# Patient Record
Sex: FEMALE | Race: WHITE | NOT HISPANIC OR LATINO | Employment: FULL TIME | URBAN - METROPOLITAN AREA
[De-identification: names, ages, dates, MRNs, and addresses within clinical notes are randomized per-mention and may not be internally consistent; named-entity substitution may affect disease eponyms.]

---

## 2021-02-17 PROBLEM — I34.1 MITRAL VALVE PROLAPSE: Status: ACTIVE | Noted: 2021-02-17

## 2022-06-24 PROBLEM — H53.9 VISUAL DISTURBANCES: Status: ACTIVE | Noted: 2022-06-24

## 2022-09-02 PROBLEM — G43.109 MIGRAINE WITH AURA AND WITHOUT STATUS MIGRAINOSUS, NOT INTRACTABLE: Status: ACTIVE | Noted: 2022-09-02

## 2023-08-04 ENCOUNTER — OFFICE VISIT (OUTPATIENT)
Dept: CARDIOLOGY | Facility: CLINIC | Age: 23
End: 2023-08-04
Payer: COMMERCIAL

## 2023-08-04 VITALS
DIASTOLIC BLOOD PRESSURE: 72 MMHG | BODY MASS INDEX: 19.4 KG/M2 | SYSTOLIC BLOOD PRESSURE: 108 MMHG | WEIGHT: 123.6 LBS | HEIGHT: 67 IN | HEART RATE: 63 BPM

## 2023-08-04 DIAGNOSIS — R55 SYNCOPE AND COLLAPSE: ICD-10-CM

## 2023-08-04 DIAGNOSIS — I34.1 MITRAL VALVE PROLAPSE: Primary | ICD-10-CM

## 2023-08-04 DIAGNOSIS — R55 NEUROCARDIOGENIC SYNCOPE: ICD-10-CM

## 2023-08-04 RX ORDER — FLUDROCORTISONE ACETATE 0.1 MG/1
0.2 TABLET ORAL DAILY
Qty: 180 TABLET | Refills: 3 | Status: SHIPPED | OUTPATIENT
Start: 2023-08-04

## 2023-08-08 ENCOUNTER — TELEPHONE (OUTPATIENT)
Dept: CARDIOLOGY | Facility: CLINIC | Age: 23
End: 2023-08-08
Payer: COMMERCIAL

## 2023-08-08 NOTE — TELEPHONE ENCOUNTER
Mara Gutierrez return call.  Transferred call to [place].    Thank you,  Fifi CASE RN  Triage Nurse LCG   16:30 EDT

## 2023-08-08 NOTE — TELEPHONE ENCOUNTER
Patient returning your call Lunch 12-1:00 if after that   she said she is at work and cannot be reached until after 5:00

## 2023-08-08 NOTE — TELEPHONE ENCOUNTER
Called and left patient a voicemail to return call to office to reach out via [facility].  I would like to discuss morgan Conde with her.

## 2023-08-09 NOTE — TELEPHONE ENCOUNTER
Spoke to patient.  Plan decrease to 0.1mg florinef daily x2 weeks then stop  She will keep BP log and let me know how she is doing in about a week

## 2023-08-29 NOTE — TELEPHONE ENCOUNTER
Patient called with an update on how she feels with the change in her medication. She is only taking Florinef 0.1 mg 1 tab daily instead of two. More passing out in the shower. B/P is running low.     She can be reached at [phone]

## 2023-08-30 ENCOUNTER — TELEPHONE (OUTPATIENT)
Dept: CARDIOLOGY | Facility: CLINIC | Age: 23
End: 2023-08-30
Payer: COMMERCIAL

## 2023-08-30 NOTE — TELEPHONE ENCOUNTER
Spoke to patient.  She had 1 episode where she nearly passed out and had to lie down.  She has had some more fatigue and intermittent dizziness but no other significant events.  Her typical BP has been around 110/60.    I have discussed this patient with Dr. Paulson.  Continue Florinef 0.1 mg daily but will not plan on any further weaning at this time.  Patient was instructed to call back with any increase in symptoms.  I will have our office call and schedule her follow-up in 6 weeks as well as echo at this time.      Scheduling, can you reschedule her for 6-week follow-up with me and move her echo up to this time.

## 2023-08-30 NOTE — TELEPHONE ENCOUNTER
Mara Gutierrez returned call.  Transferred to [place].    Thank you,  Fifi CASE RN  Triage Nurse LCG   15:17 EDT

## 2023-09-05 ENCOUNTER — TELEPHONE (OUTPATIENT)
Dept: CARDIOLOGY | Facility: CLINIC | Age: 23
End: 2023-09-05

## 2023-09-05 NOTE — TELEPHONE ENCOUNTER
Caller: Mara Gutierrez    Relationship to patient: Self    Best call back number: [phone]    Type of visit: ECHO AND FOLLOW UP     Requested date: 10-26-23     If rescheduling, when is the original appointment: 02-24-24     Additional notes: PT IS WANTING TO GET HER ECHO AND FOLLOW UP R/S FOR 10-26-23. ATTEMPTED TO WARM TRANSFER. PLEASE REACH OUT TO PT TO FURTHER ADVISE.

## 2023-10-26 ENCOUNTER — HOSPITAL ENCOUNTER (OUTPATIENT)
Dept: CARDIOLOGY | Facility: HOSPITAL | Age: 23
Discharge: HOME | End: 2023-10-26
Admitting: NURSE PRACTITIONER
Payer: COMMERCIAL

## 2023-10-26 ENCOUNTER — OFFICE VISIT (OUTPATIENT)
Dept: CARDIOLOGY | Facility: CLINIC | Age: 23
End: 2023-10-26
Payer: COMMERCIAL

## 2023-10-26 VITALS
WEIGHT: 123 LBS | HEART RATE: 67 BPM | HEIGHT: 63 IN | BODY MASS INDEX: 21.79 KG/M2 | DIASTOLIC BLOOD PRESSURE: 70 MMHG | SYSTOLIC BLOOD PRESSURE: 100 MMHG

## 2023-10-26 VITALS — BODY MASS INDEX: 19.3 KG/M2 | HEIGHT: 67 IN | HEART RATE: 83 BPM | WEIGHT: 123 LBS

## 2023-10-26 DIAGNOSIS — R55 NEUROCARDIOGENIC SYNCOPE: ICD-10-CM

## 2023-10-26 DIAGNOSIS — I34.1 MITRAL VALVE PROLAPSE: Primary | ICD-10-CM

## 2023-10-26 DIAGNOSIS — I34.1 MITRAL VALVE PROLAPSE: ICD-10-CM

## 2023-10-26 LAB
AORTIC ARCH: 1.4 CM
ASCENDING AORTA: 2.2 CM
BH CV ECHO MEAS - ACS: 1.59 CM
BH CV ECHO MEAS - AO MAX PG: 14.1 MMHG
BH CV ECHO MEAS - AO MEAN PG: 8 MMHG
BH CV ECHO MEAS - AO ROOT DIAM: 2.48 CM
BH CV ECHO MEAS - AO V2 MAX: 188 CM/SEC
BH CV ECHO MEAS - AO V2 VTI: 35.1 CM
BH CV ECHO MEAS - AVA(I,D): 1.74 CM2
BH CV ECHO MEAS - EDV(CUBED): 125 ML
BH CV ECHO MEAS - EDV(MOD-SP2): 104 ML
BH CV ECHO MEAS - EDV(MOD-SP4): 91 ML
BH CV ECHO MEAS - EF(MOD-BP): 60.6 %
BH CV ECHO MEAS - EF(MOD-SP2): 66.3 %
BH CV ECHO MEAS - EF(MOD-SP4): 56 %
BH CV ECHO MEAS - ESV(CUBED): 39.7 ML
BH CV ECHO MEAS - ESV(MOD-SP2): 35 ML
BH CV ECHO MEAS - ESV(MOD-SP4): 40 ML
BH CV ECHO MEAS - FS: 31.8 %
BH CV ECHO MEAS - IVS/LVPW: 1.17 CM
BH CV ECHO MEAS - IVSD: 0.7 CM
BH CV ECHO MEAS - LAT PEAK E' VEL: 20.2 CM/SEC
BH CV ECHO MEAS - LV DIASTOLIC VOL/BSA (35-75): 55.3 CM2
BH CV ECHO MEAS - LV MASS(C)D: 104.6 GRAMS
BH CV ECHO MEAS - LV MAX PG: 6.4 MMHG
BH CV ECHO MEAS - LV MEAN PG: 4 MMHG
BH CV ECHO MEAS - LV SYSTOLIC VOL/BSA (12-30): 24.3 CM2
BH CV ECHO MEAS - LV V1 MAX: 126 CM/SEC
BH CV ECHO MEAS - LV V1 VTI: 22.1 CM
BH CV ECHO MEAS - LVIDD: 5 CM
BH CV ECHO MEAS - LVIDS: 3.4 CM
BH CV ECHO MEAS - LVOT AREA: 2.8 CM2
BH CV ECHO MEAS - LVOT DIAM: 1.88 CM
BH CV ECHO MEAS - LVPWD: 0.6 CM
BH CV ECHO MEAS - MED PEAK E' VEL: 14.7 CM/SEC
BH CV ECHO MEAS - MR MAX PG: 105.8 MMHG
BH CV ECHO MEAS - MR MAX VEL: 514.4 CM/SEC
BH CV ECHO MEAS - MV A DUR: 0.1 SEC
BH CV ECHO MEAS - MV A MAX VEL: 78 CM/SEC
BH CV ECHO MEAS - MV DEC SLOPE: 428.5 CM/SEC2
BH CV ECHO MEAS - MV DEC TIME: 0.2 SEC
BH CV ECHO MEAS - MV E MAX VEL: 104 CM/SEC
BH CV ECHO MEAS - MV E/A: 1.33
BH CV ECHO MEAS - MV MAX PG: 4.8 MMHG
BH CV ECHO MEAS - MV MEAN PG: 2.04 MMHG
BH CV ECHO MEAS - MV P1/2T: 75.7 MSEC
BH CV ECHO MEAS - MV V2 VTI: 26.4 CM
BH CV ECHO MEAS - MVA(P1/2T): 2.9 CM2
BH CV ECHO MEAS - MVA(VTI): 2.31 CM2
BH CV ECHO MEAS - PA ACC TIME: 0.17 SEC
BH CV ECHO MEAS - PA V2 MAX: 102 CM/SEC
BH CV ECHO MEAS - PULM A REVS DUR: 0.11 SEC
BH CV ECHO MEAS - PULM A REVS VEL: 24.9 CM/SEC
BH CV ECHO MEAS - PULM DIAS VEL: 61.7 CM/SEC
BH CV ECHO MEAS - PULM S/D: 0.89
BH CV ECHO MEAS - PULM SYS VEL: 54.8 CM/SEC
BH CV ECHO MEAS - QP/QS: 0.44
BH CV ECHO MEAS - RAP SYSTOLE: 3 MMHG
BH CV ECHO MEAS - RV MAX PG: 2.07 MMHG
BH CV ECHO MEAS - RV V1 MAX: 72 CM/SEC
BH CV ECHO MEAS - RV V1 VTI: 15.3 CM
BH CV ECHO MEAS - RVOT DIAM: 1.49 CM
BH CV ECHO MEAS - RVSP: 31 MMHG
BH CV ECHO MEAS - SI(MOD-SP2): 42 ML/M2
BH CV ECHO MEAS - SI(MOD-SP4): 31 ML/M2
BH CV ECHO MEAS - SUP REN AO DIAM: 1.8 CM
BH CV ECHO MEAS - SV(LVOT): 61.1 ML
BH CV ECHO MEAS - SV(MOD-SP2): 69 ML
BH CV ECHO MEAS - SV(MOD-SP4): 51 ML
BH CV ECHO MEAS - SV(RVOT): 26.7 ML
BH CV ECHO MEAS - TAPSE (>1.6): 2.34 CM
BH CV ECHO MEAS - TR MAX PG: 27.9 MMHG
BH CV ECHO MEAS - TR MAX VEL: 264.2 CM/SEC
BH CV ECHO MEASUREMENTS AVERAGE E/E' RATIO: 5.96
BH CV XLRA - RV BASE: 3.2 CM
BH CV XLRA - RV LENGTH: 6.4 CM
BH CV XLRA - RV MID: 2.6 CM
BH CV XLRA - TDI S': 16.3 CM/SEC
LEFT ATRIUM VOLUME INDEX: 24 ML/M2
SINUS: 2.5 CM
STJ: 1.78 CM

## 2023-10-26 PROCEDURE — 93306 TTE W/DOPPLER COMPLETE: CPT

## 2023-10-26 NOTE — PROGRESS NOTES
[facility] Follow Up Office Note     Encounter Date:10/26/23  Patient:Mara Gutierrez  :2000  MRN:[id number]      Chief Complaint:   Chief Complaint   Patient presents with    Mitral Valve Prolapse    Loss of Consciousness         History of Presenting Illness:        Mara Gutierrez is a 23 y.o. female who is here for follow-up.  She is a patient of Dr Paulson.    Patient has past medical history significant for mitral valve prolapse, palpitations, prior syncopal episodes.    In 2022 patient was seen in our office complaining of visual changes.  These were not felt to be related to her heart and she was referred to neurology.  Subsequently, she was seen by neurology in [place] and per patient diagnosed with migraine with aura and neurocardiogenic syncope.    I saw patient a couple of months ago.  Since she has been on Florinef for a couple of years now and had not had recurrent syncope we discharged to try to wean this.  Her dose was decreased from 0.2 mg daily to 0.1 mg daily.  Patient is back today for follow-up.  She had an echo prior to her appointment that shows normal LVEF with mild mitral valve prolapse and no significant regurgitation.    Patient reports she has had several dizzy episodes but no syncope since weaning the dose.  She also feels more tired at the end of the day.  She is concerned because her cousin was just diagnosed with Malcolm-Parkinson-White and had genetic testing that flagged a potential issue.  He is getting an ICD.  She does not know more specifics about his condition but is wondering about having genetic testing.  She still intermittently has episodes where her vision goes black or is blurry for about 10 minutes.  This was previously worked up with a normal MR brain and she was diagnosed with migraines with aura.    Review of Systems:  Review of Systems   Constitutional: Positive for malaise/fatigue.   Eyes:  Positive for visual disturbance.   Cardiovascular:   "Negative for chest pain, dyspnea on exertion, leg swelling, orthopnea and palpitations.   Respiratory:  Negative for shortness of breath.    Neurological:  Positive for dizziness.       Current Outpatient Medications on File Prior to Visit   Medication Sig Dispense Refill    Falmina 0.1-20 MG-MCG per tablet Take 1 tablet by mouth Daily.      fludrocortisone 0.1 MG tablet Take 2 tablets by mouth Daily. 180 tablet 3     No current facility-administered medications on file prior to visit.       Allergies   Allergen Reactions    Clindamycin/Lincomycin Anaphylaxis    Bactrim [Sulfamethoxazole-Trimethoprim] Other (See Comments)     Metal taste     Shellfish-Derived Products Other (See Comments)     Blister inside mouth    Acyclovir Rash     blisters    Adhesive Tape Rash     Band-aids only       History reviewed. No pertinent past medical history.    History reviewed. No pertinent surgical history.    Social History     Socioeconomic History    Marital status: Single   Tobacco Use    Smoking status: Never    Smokeless tobacco: Never   Substance and Sexual Activity    Alcohol use: Never     Comment: occas caffeine use     Drug use: Never       Family History   Problem Relation Age of Onset    Diabetes Maternal Aunt     Diabetes Maternal Grandmother        The following portions of the patient's history were reviewed and updated as appropriate: allergies, current medications, past family history, past medical history, past social history, past surgical history and problem list.       Objective:       Vitals:    10/26/23 1354   BP: 100/70   BP Location: Right arm   Patient Position: Sitting   Pulse: 67   Weight: 55.8 kg (123 lb)   Height: 160 cm (63\")         Physical Exam:  Constitutional: Well appearing, well developed, no acute distress   HENT: Oropharynx clear and membrane moist  Eyes: Normal conjunctiva, no sclera icterus  Neck: Supple, no carotid bruit bilaterally  Cardiovascular: Regular rate and rhythm, No Murmur, No " "bilateral lower extremity edema  Pulmonary: Normal respiratory effort, normal lung sounds, no wheezing  Neurological: Alert and orient x 3  Skin: Warm, dry, no ecchymosis, no rash  Psych: Appropriate mood and affect. Normal judgment and insight         Lab Results   Component Value Date     10/01/2021    K 3.8 10/01/2021     10/01/2021    CO2 24.4 10/01/2021    BUN 8 10/01/2021    CREATININE 0.63 10/01/2021    EGFRIFNONA 117 10/01/2021    GLUCOSE 92 10/01/2021    CALCIUM 9.2 10/01/2021     No results found for: \"WBC\", \"HGB\", \"HCT\", \"MCV\", \"PLT\"  No results found for: \"CHOL\", \"TRIG\", \"HDL\", \"LDL\"  No results found for: \"PROBNP\", \"BNP\"  No results found for: \"CKTOTAL\", \"CKMB\", \"CKMBINDEX\", \"TROPONINI\", \"TROPONINT\"  No results found for: \"TSH\"        ECG 12 Lead    Date/Time: 10/26/2023 3:37 PM  Performed by: Ivone Matamoros APRN    Authorized by: Ivone Matamoros APRN  Comparison: compared with previous ECG from 8/4/2023  Similar to previous ECG  Rhythm: sinus rhythm  Rate: normal  BPM: 67  Conduction: conduction normal  ST Segments: ST segments normal             Assessment:          Diagnosis Plan   1. Mitral valve prolapse  ECG 12 Lead      2. Neurocardiogenic syncope               Plan:       Mitral valve prolapse - mild with no significant regurgitation on echocardiogram today    Syncope /orthostatic hypotension- likely vasodilatory / neurovascular with post viral etiology.  Her resting heart rate is controlled in the 60s.  We recently weaned Florinef from 0.2 mg daily to 0.1 mg daily.  She notes increased fatigue and has had a couple of dizzy episodes with no syncope.  We discussed this today and she would like to continue on her current dose in hopes of eventually not requiring medication.  She tries to hydrate and liberate salt intake.  She also is cautious with position changes and hot showers.  She will try compression.    Patient is seen today for follow-up.  She is tolerating reduced " Florinef dose and will continue the same as well as lifestyle modifications.  Her echo today is stable.      In regards to cousin that was just diagnosed with Malcolm-Parkinson-White and ? cardiomyopathy, her EKG does not have delta wave and WPW is not a current concern for her.  I discussed this with Dr. Paulson.  In regards to genetic testing for cardiomyopathy I will call patient. I am happy to try to order as long as she understands this is not typically indicated for her until her parent has abnormal testing and is unlikely to be covered by insurance.    Follow-up recommended in 6 months with Dr Paulson        Orders Placed This Encounter   Procedures    ECG 12 Lead     This order was created via procedure documentation     Order Specific Question:   Release to patient     Answer:   Routine Release [[id number]]            GERSON Page  [facility]  10/26/23  15:41 EDT

## 2023-10-30 ENCOUNTER — TELEPHONE (OUTPATIENT)
Dept: CARDIOLOGY | Facility: CLINIC | Age: 23
End: 2023-10-30
Payer: COMMERCIAL

## 2023-11-02 ENCOUNTER — TELEPHONE (OUTPATIENT)
Dept: CARDIOLOGY | Facility: CLINIC | Age: 23
End: 2023-11-02
Payer: COMMERCIAL

## 2023-11-02 NOTE — TELEPHONE ENCOUNTER
Patient left a voicemail this morning stating that she is returning your call from Monday. I dont see anything in her chart where you had called her.     Call back number is [phone].      Please advise.

## 2023-11-10 NOTE — TELEPHONE ENCOUNTER
I have tried to call her.   She has a voicemail that has not been set up yet so I have not been able to connect or leave a message.    If she calls back, I want to let her know that in regards to genetic testing this is not the typical order to get testing however I can refer her to genetic counseling at [facility] and request cardiac panel.  It would be helpful if she can give us the specific gene that is abnormal in her family so I can include that information in the referral as well.  If she is agreeable I will place the order.    Thanks

## 2023-11-13 NOTE — TELEPHONE ENCOUNTER
Can you try to reach her today regarding information as below.  If she is agreeable I will put the genetics consult in.  Thanks

## 2025-02-04 RX ORDER — FLUDROCORTISONE ACETATE 0.1 MG/1
0.2 TABLET ORAL DAILY
Qty: 60 TABLET | Refills: 1 | Status: SHIPPED | OUTPATIENT
Start: 2025-02-04

## 2025-02-04 NOTE — TELEPHONE ENCOUNTER
Patient is currently out of medication. She was last seen 2023. I am working on getting her an apt. Ok to refill for 30 day and 1 refill.

## 2025-03-17 RX ORDER — FLUDROCORTISONE ACETATE 0.1 MG/1
0.2 TABLET ORAL DAILY
Qty: 180 TABLET | Refills: 3 | Status: SHIPPED | OUTPATIENT
Start: 2025-03-17

## 2025-04-03 ENCOUNTER — OFFICE VISIT (OUTPATIENT)
Age: 25
End: 2025-04-03
Payer: COMMERCIAL

## 2025-04-03 VITALS
BODY MASS INDEX: 22.01 KG/M2 | SYSTOLIC BLOOD PRESSURE: 108 MMHG | HEART RATE: 74 BPM | DIASTOLIC BLOOD PRESSURE: 62 MMHG | HEIGHT: 63 IN | WEIGHT: 124.2 LBS

## 2025-04-03 DIAGNOSIS — R55 NEUROCARDIOGENIC SYNCOPE: ICD-10-CM

## 2025-04-03 DIAGNOSIS — I34.1 MITRAL VALVE PROLAPSE: Primary | ICD-10-CM

## 2025-04-03 PROCEDURE — 99214 OFFICE O/P EST MOD 30 MIN: CPT | Performed by: INTERNAL MEDICINE

## 2025-04-03 PROCEDURE — 93000 ELECTROCARDIOGRAM COMPLETE: CPT | Performed by: INTERNAL MEDICINE

## 2025-04-03 RX ORDER — FLUDROCORTISONE ACETATE 0.1 MG/1
0.1 TABLET ORAL DAILY
Qty: 90 TABLET | Refills: 3 | Status: SHIPPED | OUTPATIENT
Start: 2025-04-03

## 2025-04-03 NOTE — PROGRESS NOTES
[facility] Follow Up Office Note     Encounter Date:25  Patient:Mara Gutierrez  :2000  MRN:[id number]      Chief Complaint:   Chief Complaint   Patient presents with    Mitral Valve Prolapse     History of Presenting Illness:      Ms. Smith is a 24 y.o. woman with past medical history notable for mitral valve prolapse who presents to our office for follow up regarding syncope.  Overall doing great on her current medical regimen she has not had any syncopal episode in the shower anymore.  She is doing well with the low-dose Florinef.  Also at work she is not having troubles with near syncope.  We were able to decrease from 0.2 mg down to 0.1 mg on the Florinef without any issues.  She did get somewhat lost to follow-up and ran out of her medications when this happened she did have issues with feeling dizzy and lightheaded and feeling worse.        Review of Systems:  Review of Systems   Constitutional: Negative.   HENT: Negative.     Eyes: Negative.    Cardiovascular:  Positive for palpitations.   Respiratory: Negative.     Endocrine: Negative.    Hematologic/Lymphatic: Negative.    Skin: Negative.    Musculoskeletal: Negative.    Gastrointestinal: Negative.    Genitourinary: Negative.    Neurological:  Positive for light-headedness.   Psychiatric/Behavioral: Negative.     Allergic/Immunologic: Negative.        Current Outpatient Medications on File Prior to Visit   Medication Sig Dispense Refill    Falmina 0.1-20 MG-MCG per tablet Take 1 tablet by mouth Daily.      [DISCONTINUED] fludrocortisone 0.1 MG tablet Take 2 tablets by mouth Daily. 180 tablet 3     No current facility-administered medications on file prior to visit.       Allergies   Allergen Reactions    Clindamycin/Lincomycin Anaphylaxis    Bactrim [Sulfamethoxazole-Trimethoprim] Other (See Comments)     Metal taste     Shellfish-Derived Products Other (See Comments)     Blister inside mouth    Acyclovir Rash     blisters     "Adhesive Tape Rash     Band-aids only       History reviewed. No pertinent past medical history.    History reviewed. No pertinent surgical history.    Social History     Socioeconomic History    Marital status: Single   Tobacco Use    Smoking status: Never    Smokeless tobacco: Never   Vaping Use    Vaping status: Never Used   Substance and Sexual Activity    Alcohol use: Never     Comment: occas caffeine use     Drug use: Never       Family History   Problem Relation Age of Onset    Diabetes Maternal Aunt     Diabetes Maternal Grandmother        The following portions of the patient's history were reviewed and updated as appropriate: allergies, current medications, past family history, past medical history, past social history, past surgical history and problem list.       Objective:       Vitals:    04/03/25 0941   BP: 108/62   BP Location: Right arm   Patient Position: Sitting   Pulse: 74   Weight: 56.3 kg (124 lb 3.2 oz)   Height: 160 cm (63\")       Body mass index is 22 kg/m².     Physical Exam:  Constitutional: Well appearing, Well-developed, No acute distress   HENT: Oropharynx clear and membrane moist  Eyes: Normal conjunctiva, no sclera icterus.  Neck: Supple, no carotid bruit bilaterally.  Cardiovascular: Regular rate and rhythm, No Murmur, No bilateral lower extremity edema.  Pulmonary: Normal respiratory effort, normal lung sounds, no wheezing.  Neurological: Alert and orient x 3.   Skin: Warm, dry, no ecchymosis, no rash.  Psych: Appropriate mood and affect. Normal judgment and insight.       Lab Results   Component Value Date     10/01/2021    K 3.8 10/01/2021     10/01/2021    CO2 24.4 10/01/2021    BUN 8 10/01/2021    CREATININE 0.63 10/01/2021    EGFRIFNONA 117 10/01/2021    GLUCOSE 92 10/01/2021    CALCIUM 9.2 10/01/2021     Lab Results   Component Value Date    WBC 6.89 04/12/2024    HGB 12.9 04/12/2024    HCT 38.6 04/12/2024    MCV 95.8 04/12/2024     04/12/2024     No results " "found for: \"CHOL\", \"TRIG\", \"HDL\", \"LDL\"  No results found for: \"PROBNP\", \"BNP\"  No results found for: \"CKTOTAL\", \"CKMB\", \"CKMBINDEX\", \"TROPONINI\", \"TROPONINT\"  No results found for: \"TSH\"        ECG 12 Lead    Date/Time: 4/3/2025 10:14 AM  Performed by: Jeronimo Paulson MD    Authorized by: Jeronimo Paulson MD  Comparison: compared with previous ECG from 10/26/2023  Similar to previous ECG  Rhythm: sinus rhythm          Echocardiogram 10/26/2023 images reviewed by myself:  Left ventricular systolic function is normal. Calculated left ventricular EF = 60.6% Normal left ventricular cavity size and wall thickness noted. All left ventricular wall segments contract normally. Left ventricular diastolic function was normal.  There is mild bileaflet mitral valve prolapse present. Mild mitral valve regurgitation is present with a posteriorly-directed jet noted.  Mild tricuspid valve regurgitation is present. Estimated right ventricular systolic pressure from tricuspid regurgitation is normal (<35 mmHg). Calculated right ventricular systolic pressure from tricuspid regurgitation is 31 mmHg.    2 Week Monitor 10/25/2021:  A normal monitor study.  Underlying heart rhythm was sinus rhythm with an average heart rate of 82 bpm and a range of 49 bpm up to 173 bpm.  Patient triggered events correlated with sinus rhythm    Holter monitor 3/3/2021:  A normal monitor study.  Underlying heart rhythm was sinus rhythm with sinus arrhythmia and average heart rate of 70 bpm and a range of 45 bpm up to 128 bpm  No symptoms recorded during study    Echocardiogram 2/17/2021:  Normal ejection fraction  Structurally normal heart with normal size chambers  Mild mitral regurgitation with an eccentric jet    Echocardiogram report 7/14/2015:  Normal left ventricular size and function  Mitral valve demonstrates mild eccentric regurgitation with mild mitral valve prolapse with normal tissue architecture with no evidence of myxomatous " disease.  Otherwise no significant valvular abnormalities.          Assessment:          Diagnosis Plan   1. Mitral valve prolapse  Basic Metabolic Panel    TSH Rfx On Abnormal To Free T4    CBC (No Diff)    ECG 12 Lead      2. Neurocardiogenic syncope  Basic Metabolic Panel    TSH Rfx On Abnormal To Free T4    CBC (No Diff)    ECG 12 Lead             Plan:       Ms. Smith is a 24 y.o. woman with past medical history notable for mitral valve prolapse who presents to our office for scheduled follow up.  Overall patient doing reasonably well.  She is doing great on the low-dose Florinef which is a good sign hopefully we can even wean her off of this completely.  Likely get a repeat echocardiogram in about 2 years but obviously if any worsening issues can get one sooner.  I have ordered repeat labs including CBC, BMP, TSH just to make sure there is no abnormalities contributing to her underlying issue last set of labs in April 2024 were within normal limits.    History of Syncope:  Likely vasovagal versus vasodilatory and have improved and essentially resolved on low-dose Florinef  Currently on Florinef 0.1 daily and would continue with current dosing  Echocardiogram 2/2021 and 10/2023 demonstrates normal heart structure with mild mitral valve regurgitation due to prolapse  Holter monitor 3/2021 and 10/2021 normal  Encourage adequate hydration and increasing salt intake  TSH within normal limits 4/2024 repeat TSH and BMP ordered    Palpitations:  Likely related to vasovagal syncope  Holter 3/2021 and 10/2021 normal    Mitral valve prolapse:  Echocardiogram 10/2023 demonstrates only mild eccentric regurgitation will follow every couple of years with subsequent echocardiograms.  Likely repeat echocardiogram in 1-2 years      Follow-up:  12 months      Thank you for allowing me to participate in the care of Mara Gutierrez. Feel free to contact me directly with any further questions or concerns.    Jeronimo Paulson,  MD [facility]  04/03/25  10:15 EDT

## 2025-05-01 ENCOUNTER — TELEPHONE (OUTPATIENT)
Dept: OBSTETRICS AND GYNECOLOGY | Age: 25
End: 2025-05-01

## 2025-05-01 NOTE — TELEPHONE ENCOUNTER
Hub staff attempted to follow warm transfer process and was unsuccessful     Caller: Mara Gutierrez    Relationship to patient: Self    Best call back number: [phone] V/M   DETAILED MSG IN REGARDS TO APPT     Patient is needing: PT HAS APPT R/S FOR 5/2/25 WANTS TO CHANGE TO 2:30 OR LATER IF POSSIBLE.    IF REQUEST CAN BE MADE PT OKAY TO SCHEDULE AND CALL V/M WITH APPT DETAILS AS LONG AS ITS AFTER 2:30PM     THANK YOU

## 2025-05-16 ENCOUNTER — OFFICE VISIT (OUTPATIENT)
Dept: OBSTETRICS AND GYNECOLOGY | Age: 25
End: 2025-05-16
Payer: COMMERCIAL

## 2025-05-16 VITALS
BODY MASS INDEX: 22.39 KG/M2 | SYSTOLIC BLOOD PRESSURE: 116 MMHG | WEIGHT: 126.4 LBS | HEIGHT: 63 IN | DIASTOLIC BLOOD PRESSURE: 70 MMHG

## 2025-05-16 DIAGNOSIS — Z01.419 ENCOUNTER FOR GYNECOLOGICAL EXAMINATION WITHOUT ABNORMAL FINDING: Primary | ICD-10-CM

## 2025-05-16 DIAGNOSIS — Z31.9 INFERTILITY MANAGEMENT: ICD-10-CM

## 2025-05-16 RX ORDER — MONTELUKAST SODIUM 10 MG/1
10 TABLET ORAL
COMMUNITY
Start: 2025-04-28

## 2025-05-16 RX ORDER — TRETINOIN 0.5 MG/G
1 CREAM TOPICAL NIGHTLY
COMMUNITY
Start: 2025-04-04

## 2025-05-16 RX ORDER — PIMECROLIMUS 10 MG/G
1 CREAM TOPICAL 2 TIMES DAILY
COMMUNITY
Start: 2025-04-04

## 2025-05-16 RX ORDER — TRIAMCINOLONE ACETONIDE 1 MG/G
1 CREAM TOPICAL 2 TIMES DAILY
COMMUNITY
Start: 2025-04-04

## 2025-05-16 NOTE — ASSESSMENT & PLAN NOTE
Reviewed attempted pregnancy course with patient.  She and her partner appear to be young and fairly healthy.  Patient is having regular monthly periods and has been using ovulation predictor kits for the past 4 months with LH surges noted.  We discussed the possible etiologies for infertility including lifestyle, uterine, fallopian tube, ovarian, male factor.  Patient appears to be completing all appropriate steps regarding lifestyle modification.  Notably she was using tretinoin and I advised her to stop this immediately.  Other medications were reviewed and discussed.  We will evaluate her uterus with a pelvic ultrasound at her next visit.  We discussed deferring hysterosalpingogram at this time given she is fairly low risk for a fallopian tube factor based on her history, but if her evaluation is otherwise normal we will pursue this.  We will evaluate ovarian reserve and ovulatory function with AMH and a 21 progesterone respectively.  A semen analysis for her partner has also been ordered.  Once her day 21 labs (planning to draw 5/26) have been completed, we will also evaluate a day 3 FSH and estradiol to further evaluate her ovarian reserve.  She has had normal thyroid testing 4/2024.  Patient understanding of plan and all questions addressed at this time.  Orders:    Antimullerian Hormone (AMH); Future    Progesterone; Future

## 2025-05-16 NOTE — PROGRESS NOTES
[facility]  Obstetrics and Gynecology   Routine Annual Visit    2025    Patient: Mara Gutierrez          MR#:[id number]    History of Present Illness    Chief Complaint   Patient presents with    Gynecologic Exam     NGYN- Establish care, pt being seen for family planning counseling, last annual 24, last pap smear 23     24 y.o. female  who presents for annual exam.    Patient also presents with difficulty getting pregnant.  She stopped taking her combined OCPs 2024 and she and her partner have been trying monthly since then.  She reports she started using clear blue OPK's 2025 and has had LH surges noted every month.  She has been having regular intercourse prior to the surges and during peak predicted fertility windows.  She reports a few months ago she had 1 positive pregnancy test but then started her period right after.  Also had a negative pregnancy test same day so thinks it was false positive.    Patient reports her periods are monthly with 5 to 6 days of bleeding.  She does not have any concerns about the amount of bleeding or any pain.  She denies any history of STI.    She reports she eats a healthy diet and tries to avoid any fried or sugary food    Studies reviewed:  TSH (2024 15:35)     Obstetric History:  OB History          0    Para   0    Term   0       0    AB   0    Living   0         SAB   0    IAB   0    Ectopic   0    Molar   0    Multiple   0    Live Births   0               Menstrual History:     Patient's last menstrual period was 2025 (exact date).       Sexual History:   Monogamous sexual relationship with male    Concern for IPV: Denies  Dyspareunia: Denies  Breast concerns: Denies  Fecal/urine incontinence: Denies  Concern for STI?:  Denies    Current contraception: none  History of abnormal Pap smear: no  Received Gardasil immunization:  yes - in adolescence  History of abnormal mammogram: n/a  Colon cancer screening:  n/a  ________________________________________  Patient Active Problem List   Diagnosis    Mitral valve prolapse    Syncope and collapse    Visual disturbances    Migraine with aura and without status migrainosus, not intractable    Neurocardiogenic syncope     History reviewed. No pertinent past medical history.  History reviewed. No pertinent surgical history.  Social History     Tobacco Use    Smoking status: Never    Smokeless tobacco: Never   Vaping Use    Vaping status: Never Used   Substance Use Topics    Alcohol use: Never     Comment: occas caffeine use     Drug use: Never     Family History   Problem Relation Age of Onset    Diabetes Maternal Grandmother     Lung cancer Maternal Grandmother     Lung cancer Maternal Grandfather     Diabetes Maternal Aunt     Liver cancer Nephew     Breast cancer Neg Hx     Uterine cancer Neg Hx     Ovarian cancer Neg Hx     Colon cancer Neg Hx     Pancreatic cancer Neg Hx     Prostate cancer Neg Hx      Prior to Admission medications    Medication Sig Start Date End Date Taking? Authorizing Provider   fludrocortisone 0.1 MG tablet Take 1 tablet by mouth Daily. 4/3/25  Yes Jeronimo Paulson MD   montelukast (SINGULAIR) 10 MG tablet Take 1 tablet by mouth every night at bedtime. 4/28/25  Yes Matt Murray MD   pimecrolimus (ELIDEL) 1 % cream Apply 1 Application topically to the appropriate area as directed 2 (Two) Times a Day. 4/4/25  Yes Matt Murray MD   tretinoin (RETIN-A) 0.05 % cream Apply 1 Application topically to the appropriate area as directed Every Night. 4/4/25  Yes Matt Murray MD   triamcinolone (KENALOG) 0.1 % cream Apply 1 Application topically to the appropriate area as directed 2 (Two) Times a Day. 4/4/25  Yes Matt Murray MD   Falmina 0.1-20 MG-MCG per tablet Take 1 tablet by mouth Daily.  Patient not taking: Reported on 5/16/2025 1/25/21   Matt Murray MD     ________________________________________    Review of  "Systems   Genitourinary:  Negative for menstrual problem and vaginal bleeding.          Objective     /70   Ht 160 cm (62.99\")   Wt 57.3 kg (126 lb 6.4 oz)   LMP 05/06/2025 (Exact Date)   BMI 22.40 kg/m²    BP Readings from Last 3 Encounters:   05/16/25 116/70   04/03/25 108/62   10/26/23 100/70      Wt Readings from Last 3 Encounters:   05/16/25 57.3 kg (126 lb 6.4 oz)   04/03/25 56.3 kg (124 lb 3.2 oz)   10/26/23 55.8 kg (123 lb)      BMI: Estimated body mass index is 22.4 kg/m² as calculated from the following:    Height as of this encounter: 160 cm (62.99\").    Weight as of this encounter: 57.3 kg (126 lb 6.4 oz).    Physical Exam  Vitals and nursing note reviewed.   Constitutional:       General: She is not in acute distress.     Appearance: Normal appearance.   HENT:      Head: Normocephalic and atraumatic.   Eyes:      Extraocular Movements: Extraocular movements intact.   Pulmonary:      Effort: Pulmonary effort is normal. No respiratory distress.   Chest:   Breasts:     Right: Normal. No mass, nipple discharge, skin change or tenderness.      Left: Normal. No mass, nipple discharge, skin change or tenderness.   Abdominal:      General: There is no distension.      Palpations: Abdomen is soft. There is no mass.      Tenderness: There is no abdominal tenderness.   Genitourinary:     General: Normal vulva.      Pubic Area: No rash.       Labia:         Right: No rash, lesion or injury.         Left: No rash, lesion or injury.       Urethra: No urethral lesion.      Vagina: Normal.      Cervix: Normal.      Uterus: Normal.       Adnexa: Right adnexa normal and left adnexa normal.      Rectum: Normal.   Lymphadenopathy:      Upper Body:      Right upper body: No supraclavicular or axillary adenopathy.      Left upper body: No supraclavicular or axillary adenopathy.   Skin:     General: Skin is warm and dry.   Neurological:      General: No focal deficit present.      Mental Status: She is alert. "   Psychiatric:         Mood and Affect: Mood normal.         Behavior: Behavior normal.       Assessment:  Mara Gutierrez is a 24 y.o.  presenting for well woman exam.       Encounter for gynecological examination without abnormal finding  Normal breast and pelvic exam today.       Infertility management  Reviewed attempted pregnancy course with patient.  She and her partner appear to be young and fairly healthy.  Patient is having regular monthly periods and has been using ovulation predictor kits for the past 4 months with LH surges noted.  We discussed the possible etiologies for infertility including lifestyle, uterine, fallopian tube, ovarian, male factor.  Patient appears to be completing all appropriate steps regarding lifestyle modification.  Notably she was using tretinoin and I advised her to stop this immediately.  Other medications were reviewed and discussed.  We will evaluate her uterus with a pelvic ultrasound at her next visit.  We discussed deferring hysterosalpingogram at this time given she is fairly low risk for a fallopian tube factor based on her history, but if her evaluation is otherwise normal we will pursue this.  We will evaluate ovarian reserve and ovulatory function with AMH and a 21 progesterone respectively.  A semen analysis for her partner has also been ordered.  Once her day 21 labs (planning to draw ) have been completed, we will also evaluate a day 3 FSH and estradiol to further evaluate her ovarian reserve.  She has had normal thyroid testing 2024.  Patient understanding of plan and all questions addressed at this time.  Orders:    Antimullerian Hormone (AMH); Future    Progesterone; Future      As part of wellness and prevention, the following topics were discussed with the patient:  Encouraged self breast awareness  Physical activity and regular exercised encouraged.   Healthy weight discussed.  Nutrition discussed.    Plan:  Return in about 2 weeks (around  5/30/2025) for f/u infertility.    Meri Adams MD  5/16/2025 14:58 EDT

## 2025-05-19 ENCOUNTER — TELEPHONE (OUTPATIENT)
Dept: OBSTETRICS AND GYNECOLOGY | Age: 25
End: 2025-05-19
Payer: COMMERCIAL

## 2025-05-19 NOTE — TELEPHONE ENCOUNTER
Caller: Mara Gutierrez    Relationship: Self    Best call back number: [phone]     What is the best time to reach you: CALL ANYTIME.IF NO ANSWER, PLEASE LEAVE DETAILED VOICEMAIL OR SEND Car reviewsHART MESSAGE.    Who are you requesting to speak with (clinical staff, provider,  specific staff member): FIRST AVAILABLE    PATIENT HAS LAB ORDERS FOR Progesterone AND Antimullerian Hormone. AT LAST OFFICE VISIT 05/16/25, PATIENT WAS TOLD TO HAVE LABS PERFORMED 05/26/25. PATIENT WILL NEED TO GO TO [facility] DUE TO WORK SCHEDULED AND THAT LOCATION IS CLOSED ON 05/26 FOR MEMORIAL DAY.     PATIENT IS REQUESTING A CALL BACK TO DISCUSS IF OKAY TO HAVE LABS PERFORMED ON 05/27/25.

## 2025-05-23 ENCOUNTER — PATIENT ROUNDING (BHMG ONLY) (OUTPATIENT)
Dept: OBSTETRICS AND GYNECOLOGY | Age: 25
End: 2025-05-23
Payer: COMMERCIAL

## 2025-05-23 NOTE — PROGRESS NOTES
A MY CHART MESSAGE HAS BEEN SENT TO THE PATIENT FOR [facility] ROUNDING.

## 2025-05-27 ENCOUNTER — LAB (OUTPATIENT)
Dept: LAB | Facility: HOSPITAL | Age: 25
End: 2025-05-27
Payer: COMMERCIAL

## 2025-05-27 DIAGNOSIS — R55 NEUROCARDIOGENIC SYNCOPE: ICD-10-CM

## 2025-05-27 DIAGNOSIS — I34.1 MITRAL VALVE PROLAPSE: ICD-10-CM

## 2025-05-27 LAB
ANION GAP SERPL CALCULATED.3IONS-SCNC: 7.3 MMOL/L (ref 5–15)
BUN SERPL-MCNC: 11 MG/DL (ref 6–20)
BUN/CREAT SERPL: 19.6 (ref 7–25)
CALCIUM SPEC-SCNC: 9.5 MG/DL (ref 8.6–10.5)
CHLORIDE SERPL-SCNC: 106 MMOL/L (ref 98–107)
CO2 SERPL-SCNC: 24.7 MMOL/L (ref 22–29)
CREAT SERPL-MCNC: 0.56 MG/DL (ref 0.57–1)
DEPRECATED RDW RBC AUTO: 45.3 FL (ref 37–54)
EGFRCR SERPLBLD CKD-EPI 2021: 129.3 ML/MIN/1.73
ERYTHROCYTE [DISTWIDTH] IN BLOOD BY AUTOMATED COUNT: 12.4 % (ref 12.3–15.4)
GLUCOSE SERPL-MCNC: 95 MG/DL (ref 65–99)
HCT VFR BLD AUTO: 37.9 % (ref 34–46.6)
HGB BLD-MCNC: 12.7 G/DL (ref 12–15.9)
MCH RBC QN AUTO: 32.7 PG (ref 26.6–33)
MCHC RBC AUTO-ENTMCNC: 33.5 G/DL (ref 31.5–35.7)
MCV RBC AUTO: 97.7 FL (ref 79–97)
PLATELET # BLD AUTO: 202 10*3/MM3 (ref 140–450)
PMV BLD AUTO: 10.8 FL (ref 6–12)
POTASSIUM SERPL-SCNC: 4.2 MMOL/L (ref 3.5–5.2)
RBC # BLD AUTO: 3.88 10*6/MM3 (ref 3.77–5.28)
SODIUM SERPL-SCNC: 138 MMOL/L (ref 136–145)
TSH SERPL DL<=0.05 MIU/L-ACNC: 1.04 UIU/ML (ref 0.27–4.2)
WBC NRBC COR # BLD AUTO: 5.34 10*3/MM3 (ref 3.4–10.8)

## 2025-05-27 PROCEDURE — 84443 ASSAY THYROID STIM HORMONE: CPT

## 2025-05-27 PROCEDURE — 82397 CHEMILUMINESCENT ASSAY: CPT | Performed by: STUDENT IN AN ORGANIZED HEALTH CARE EDUCATION/TRAINING PROGRAM

## 2025-05-27 PROCEDURE — 85027 COMPLETE CBC AUTOMATED: CPT

## 2025-05-27 PROCEDURE — 84144 ASSAY OF PROGESTERONE: CPT | Performed by: STUDENT IN AN ORGANIZED HEALTH CARE EDUCATION/TRAINING PROGRAM

## 2025-05-27 PROCEDURE — 80048 BASIC METABOLIC PNL TOTAL CA: CPT

## 2025-06-03 ENCOUNTER — OFFICE VISIT (OUTPATIENT)
Dept: OBSTETRICS AND GYNECOLOGY | Age: 25
End: 2025-06-03
Payer: COMMERCIAL

## 2025-06-03 VITALS
HEIGHT: 63 IN | DIASTOLIC BLOOD PRESSURE: 70 MMHG | WEIGHT: 124.8 LBS | BODY MASS INDEX: 22.11 KG/M2 | SYSTOLIC BLOOD PRESSURE: 106 MMHG

## 2025-06-03 DIAGNOSIS — Z31.9 INFERTILITY MANAGEMENT: Primary | ICD-10-CM

## 2025-06-03 RX ORDER — FAMOTIDINE 20 MG/1
20 TABLET, FILM COATED ORAL 2 TIMES DAILY PRN
COMMUNITY
Start: 2025-05-29

## 2025-06-03 NOTE — ASSESSMENT & PLAN NOTE
Last appointment reviewed lifestyle modifications, use of ovulation predictor kits and intercourse during time of peak fertility.  Patient completed a day 22 progesterone and AMH which overall were consistent with good ovulatory function and ovarian reserve.  Ultrasound completed today was notable for a 3 cm simple cyst on her right ovary but uterine cavity appears normal.  We are awaiting semen analysis results from her partner and if normal, we will pursue completion of hysterosalpingogram for the patient.  If either of these tests are abnormal we will refer to [facility].  If they are both normal, we will pursue ovulation induction with letrozole which I discussed with the patient today and provided a handout on.  Discussed that we would try 3 cycles at 2.5 mg dosage of letrozole and then 3 cycles at 5 mg dosage.  If the 6 cycles are unsuccessful, I would then recommend referral to [facility].  Patient expressed understanding of plan.  Handout provided on letrozole includes anticipated risks, benefits, side effects.          
[place] [employer] # [id number]  36-year-old male with no reported past medical history that presents with chest pain.  Patient states that for the last few days he has had had chest pain from the epigastric rating to his chest none rating to his back otherwise.  No associated nausea, vomiting, fevers, chills, headache, vision or hearing changes, arm pain or tingling or paresthesias.  No falls or trauma no rashes.  Patient is non-smoker no family history of ACS in early age.  Patient states that his symptoms worse when laying supine improves when sitting up.  Denies any diarrhea no recent travel.  He states that he saw a GI doctor who recommended H. pylori testing but he has not received the testing yet.

## 2025-06-03 NOTE — PROGRESS NOTES
[facility]  Obstetrics and Gynecology   Return Gynecology Visit    6/3/2025    Patient: Mara Gutierrez          MR#:[id number]    History of Present Illness    Chief Complaint   Patient presents with    Follow-up     Infertility, U/S today, pt has no complaints today     24 y.o. female  who presents for follow-up of infertility.  Patient has overall been feeling well since last appointment.  Reports her partner completed his semen analysis yesterday and is awaiting results.  She is due to start her period in the next day or 2.    Obstetric History:  OB History          0    Para   0    Term   0       0    AB   0    Living   0         SAB   0    IAB   0    Ectopic   0    Molar   0    Multiple   0    Live Births   0               Menstrual History:     Patient's last menstrual period was 2025 (exact date).     ________________________________________  Patient Active Problem List   Diagnosis    Mitral valve prolapse    Syncope and collapse    Visual disturbances    Migraine with aura and without status migrainosus, not intractable    Neurocardiogenic syncope    Infertility management     Past Medical History:   Diagnosis Date    Heart murmur     Vasovagal syncope      History reviewed. No pertinent surgical history.  Social History     Tobacco Use    Smoking status: Never    Smokeless tobacco: Never   Vaping Use    Vaping status: Never Used   Substance Use Topics    Alcohol use: Never     Comment: occas caffeine use     Drug use: Never     Family History   Problem Relation Age of Onset    Diabetes Maternal Grandmother     Lung cancer Maternal Grandmother     Lung cancer Maternal Grandfather     Diabetes Maternal Aunt     Liver cancer Nephew     Breast cancer Neg Hx     Uterine cancer Neg Hx     Ovarian cancer Neg Hx     Colon cancer Neg Hx     Pancreatic cancer Neg Hx     Prostate cancer Neg Hx      Prior to Admission medications    Medication Sig Start Date End Date Taking?  "Authorizing Provider   famotidine (PEPCID) 20 MG tablet Take 1 tablet by mouth 2 (Two) Times a Day As Needed for Heartburn or Indigestion. 5/29/25  Yes Matt Murray MD   fludrocortisone 0.1 MG tablet Take 1 tablet by mouth Daily. 4/3/25  Yes Jeronimo Paulson MD   montelukast (SINGULAIR) 10 MG tablet Take 1 tablet by mouth every night at bedtime. 4/28/25  Yes Matt Murray MD   pimecrolimus (ELIDEL) 1 % cream Apply 1 Application topically to the appropriate area as directed 2 (Two) Times a Day. 4/4/25  Yes Matt Murray MD   tretinoin (RETIN-A) 0.05 % cream Apply 1 Application topically to the appropriate area as directed Every Night. 4/4/25  Yes Matt Murray MD   triamcinolone (KENALOG) 0.1 % cream Apply 1 Application topically to the appropriate area as directed 2 (Two) Times a Day. 4/4/25  Yes Matt Murray MD     ________________________________________    Review of Systems   Genitourinary:  Negative for menstrual problem.          Objective     /70   Ht 160 cm (62.99\")   Wt 56.6 kg (124 lb 12.8 oz)   LMP 05/06/2025 (Exact Date)   BMI 22.11 kg/m²    BP Readings from Last 3 Encounters:   06/03/25 106/70   05/16/25 116/70   04/03/25 108/62      Wt Readings from Last 3 Encounters:   06/03/25 56.6 kg (124 lb 12.8 oz)   05/16/25 57.3 kg (126 lb 6.4 oz)   04/03/25 56.3 kg (124 lb 3.2 oz)        BMI: Estimated body mass index is 22.11 kg/m² as calculated from the following:    Height as of this encounter: 160 cm (62.99\").    Weight as of this encounter: 56.6 kg (124 lb 12.8 oz).    Physical Exam  Vitals and nursing note reviewed.   Constitutional:       General: She is not in acute distress.     Appearance: Normal appearance.   HENT:      Head: Normocephalic and atraumatic.   Eyes:      Extraocular Movements: Extraocular movements intact.   Pulmonary:      Effort: Pulmonary effort is normal. No respiratory distress.   Abdominal:      General: There is no distension. "   Skin:     General: Skin is warm and dry.   Neurological:      General: No focal deficit present.      Mental Status: She is alert.   Psychiatric:         Mood and Affect: Mood normal.         Behavior: Behavior normal.       Assessment:  Mara Gutierrez is a 24 y.o.  presenting for follow-up of infertility.     Infertility management  Last appointment reviewed lifestyle modifications, use of ovulation predictor kits and intercourse during time of peak fertility.  Patient completed a day 22 progesterone and AMH which overall were consistent with good ovulatory function and ovarian reserve.  Ultrasound completed today was notable for a 3 cm simple cyst on her right ovary but uterine cavity appears normal.  We are awaiting semen analysis results from her partner and if normal, we will pursue completion of hysterosalpingogram for the patient.  If either of these tests are abnormal we will refer to [facility].  If they are both normal, we will pursue ovulation induction with letrozole which I discussed with the patient today and provided a handout on.  Discussed that we would try 3 cycles at 2.5 mg dosage of letrozole and then 3 cycles at 5 mg dosage.  If the 6 cycles are unsuccessful, I would then recommend referral to [facility].  Patient expressed understanding of plan.  Handout provided on letrozole includes anticipated risks, benefits, side effects.          Plan:  Pending further testing    Meri Adams MD  6/3/2025 14:16 EDT

## 2025-06-11 ENCOUNTER — HOSPITAL ENCOUNTER (OUTPATIENT)
Dept: GENERAL RADIOLOGY | Facility: HOSPITAL | Age: 25
Discharge: HOME | End: 2025-06-11
Payer: COMMERCIAL

## 2025-06-11 DIAGNOSIS — Z31.9 INFERTILITY MANAGEMENT: ICD-10-CM

## 2025-06-11 PROCEDURE — 74740 X-RAY FEMALE GENITAL TRACT: CPT

## 2025-06-11 PROCEDURE — 25510000001 IOPAMIDOL 61 % SOLUTION: Performed by: STUDENT IN AN ORGANIZED HEALTH CARE EDUCATION/TRAINING PROGRAM

## 2025-06-11 RX ORDER — IOPAMIDOL 612 MG/ML
100 INJECTION, SOLUTION INTRAVASCULAR
Status: COMPLETED | OUTPATIENT
Start: 2025-06-11 | End: 2025-06-11

## 2025-06-11 RX ADMIN — IOPAMIDOL 10 ML: 612 INJECTION, SOLUTION INTRAVENOUS at 12:00

## 2025-06-26 ENCOUNTER — OFFICE VISIT (OUTPATIENT)
Dept: OBSTETRICS AND GYNECOLOGY | Age: 25
End: 2025-06-26
Payer: COMMERCIAL

## 2025-06-26 VITALS
BODY MASS INDEX: 21.9 KG/M2 | SYSTOLIC BLOOD PRESSURE: 114 MMHG | HEIGHT: 63 IN | WEIGHT: 123.6 LBS | DIASTOLIC BLOOD PRESSURE: 70 MMHG

## 2025-06-26 DIAGNOSIS — N89.8 VAGINAL ODOR: Primary | ICD-10-CM

## 2025-06-26 NOTE — PROGRESS NOTES
[facility]  Obstetrics and Gynecology   Return Gynecology Visit    2025    Patient: Mara Gutierrez          MR#:[id number]    History of Present Illness    Chief Complaint   Patient presents with    Gynecologic Exam     Patient being seen for vaginal odor. She states she's had the odor sicne the procedure with checking her fallopian tubes.     24 y.o. female  who presents for an acute visit for vaginal odor.    Patient reports she has noticed a change in her discharge's odor since her hysterosalpingogram.  Not associated with any abnormal bleeding or irritation.  Feels like her discharge appears normal but that there is a bit more of it than usual.    Has not started letrozole.  Tentatively planning to with her next cycle.    Obstetric History:  OB History          0    Para   0    Term   0       0    AB   0    Living   0         SAB   0    IAB   0    Ectopic   0    Molar   0    Multiple   0    Live Births   0               Menstrual History:     Patient's last menstrual period was 2025 (exact date).     ________________________________________  Patient Active Problem List   Diagnosis    Mitral valve prolapse    Syncope and collapse    Visual disturbances    Migraine with aura and without status migrainosus, not intractable    Neurocardiogenic syncope    Infertility management     Past Medical History:   Diagnosis Date    Heart murmur     Vasovagal syncope      History reviewed. No pertinent surgical history.  Social History     Tobacco Use    Smoking status: Never    Smokeless tobacco: Never   Vaping Use    Vaping status: Never Used   Substance Use Topics    Alcohol use: Never     Comment: occas caffeine use     Drug use: Never     Family History   Problem Relation Age of Onset    Diabetes Maternal Grandmother     Lung cancer Maternal Grandmother     Lung cancer Maternal Grandfather     Diabetes Maternal Aunt     Liver cancer Nephew     Breast cancer Neg Hx     Uterine  "cancer Neg Hx     Ovarian cancer Neg Hx     Colon cancer Neg Hx     Pancreatic cancer Neg Hx     Prostate cancer Neg Hx      Prior to Admission medications    Medication Sig Start Date End Date Taking? Authorizing Provider   famotidine (PEPCID) 20 MG tablet Take 1 tablet by mouth 2 (Two) Times a Day As Needed for Heartburn or Indigestion. 5/29/25  Yes Matt Murray MD   fludrocortisone 0.1 MG tablet Take 1 tablet by mouth Daily. 4/3/25  Yes Jeronimo Paulson MD   letrozole (FEMARA) 2.5 MG tablet Take 1 tablet by mouth Daily. Cycle days 3 through 7 6/12/25  Yes Meri Adams MD   montelukast (SINGULAIR) 10 MG tablet Take 1 tablet by mouth every night at bedtime. 4/28/25  Yes aMtt Murray MD   pimecrolimus (ELIDEL) 1 % cream Apply 1 Application topically to the appropriate area as directed 2 (Two) Times a Day. 4/4/25  Yes Matt Murray MD   tretinoin (RETIN-A) 0.05 % cream Apply 1 Application topically to the appropriate area as directed Every Night. 4/4/25  Yes Matt Murray MD   triamcinolone (KENALOG) 0.1 % cream Apply 1 Application topically to the appropriate area as directed 2 (Two) Times a Day. 4/4/25  Yes Matt Murray MD     ________________________________________    Review of Systems   Genitourinary:         Odor            Objective     /70   Ht 160 cm (62.99\")   Wt 56.1 kg (123 lb 9.6 oz)   LMP 06/04/2025 (Exact Date)   BMI 21.90 kg/m²    BP Readings from Last 3 Encounters:   06/26/25 114/70   06/03/25 106/70   05/16/25 116/70      Wt Readings from Last 3 Encounters:   06/26/25 56.1 kg (123 lb 9.6 oz)   06/03/25 56.6 kg (124 lb 12.8 oz)   05/16/25 57.3 kg (126 lb 6.4 oz)        BMI: Estimated body mass index is 21.9 kg/m² as calculated from the following:    Height as of this encounter: 160 cm (62.99\").    Weight as of this encounter: 56.1 kg (123 lb 9.6 oz).    Physical Exam  Vitals and nursing note reviewed.   Constitutional:       General: She is not " in acute distress.     Appearance: Normal appearance.   HENT:      Head: Normocephalic and atraumatic.   Eyes:      Extraocular Movements: Extraocular movements intact.   Pulmonary:      Effort: Pulmonary effort is normal. No respiratory distress.   Abdominal:      General: There is no distension.      Palpations: Abdomen is soft. There is no mass.      Tenderness: There is no abdominal tenderness.   Genitourinary:     General: Normal vulva.      Pubic Area: No rash.       Labia:         Right: No rash, lesion or injury.         Left: No rash, lesion or injury.       Urethra: No urethral lesion.      Vagina: Normal.      Cervix: Discharge present.      Uterus: Normal.       Adnexa: Right adnexa normal and left adnexa normal.      Rectum: Normal.      Comments: Moderate amount of thin off white discharge  Skin:     General: Skin is warm and dry.   Neurological:      General: No focal deficit present.      Mental Status: She is alert.   Psychiatric:         Mood and Affect: Mood normal.         Behavior: Behavior normal.       Assessment:  Mara Gutierrez is a 24 y.o.  presenting for acute visit for vaginal odor.       Vaginal odor  Will treat based on results.  Orders:    NuSwab VG+ - Swab, Vagina        Plan:  F/u PRN    Meri Adams MD  2025 11:52 EDT

## 2025-06-29 LAB
A VAGINAE DNA VAG QL NAA+PROBE: ABNORMAL SCORE
BVAB2 DNA VAG QL NAA+PROBE: ABNORMAL SCORE
C ALBICANS DNA VAG QL NAA+PROBE: NEGATIVE
C GLABRATA DNA VAG QL NAA+PROBE: NEGATIVE
C TRACH DNA SPEC QL NAA+PROBE: NEGATIVE
MEGA1 DNA VAG QL NAA+PROBE: ABNORMAL SCORE
N GONORRHOEA DNA VAG QL NAA+PROBE: NEGATIVE
T VAGINALIS DNA VAG QL NAA+PROBE: NEGATIVE